# Patient Record
Sex: FEMALE | Race: WHITE
[De-identification: names, ages, dates, MRNs, and addresses within clinical notes are randomized per-mention and may not be internally consistent; named-entity substitution may affect disease eponyms.]

---

## 2018-07-19 ENCOUNTER — HOSPITAL ENCOUNTER (OUTPATIENT)
Dept: HOSPITAL 47 - SLEEP | Age: 83
End: 2018-07-19
Attending: INTERNAL MEDICINE
Payer: MEDICARE

## 2018-07-19 DIAGNOSIS — Z53.9: ICD-10-CM

## 2018-07-19 DIAGNOSIS — G47.33: Primary | ICD-10-CM

## 2018-07-19 NOTE — PN
PROGRESS NOTE



DATE OF SERVICE:

07/19/2018



This patient is an 83-year-old lady who has been followed in the sleep center for

treatment of obstructive sleep apnea-hypopnea syndrome.



The patient is on treatment with CPAP and uses it well, but recently she developed some

problems related to humidity.  The humidifier is working, but for some reason there was

not enough humidity in her mask.



[place] Sleepiness Scale today is 16.



I checked her CPAP unit.  CPAP pressure in the machine is only 4 cm of water.  I

checked the humidifier, and it is working.  There is production of heat on the base of

the humidifier.



MEDICATIONS:

1. Insulin.

2. Simvastatin.

3. Lotensin.



PHYSICAL EXAMINATION:

GENERAL  A pleasant lady without  distress.

VITAL SIGNS: /44, HR 78, RR 14, height 5 feet 0 inches, weight 134.8, BMI 26.1.

Temperature 97.9. Oxygen saturation at room air 97%.

HEENT: PERRLA, EOMI. Evaluation of oropharynx showed tongue protrudes midline; low

position of soft palate.

NECK: Supple. No JVD.  Thyroid is not palpable.

LUNGS: Clear to percussion and to auscultation.  Good air exchange.  No wheezing or

rhonchi.

HEART: S1, S2 regular.  No murmurs, gallops or rubs.

ABDOMEN: Soft and nontender.  Bowel sounds are present.  No organomegaly appreciated.

EXTREMITIES : No clubbing or cyanosis.

CNS: Awake, alert, and oriented X3.  Cranial nerves 2 to 7 intact.  There is no

fasciculation or atrophy. noted.  No focal deficits observed.



IMPRESSION:

1. Severe obstructive apnea-hypopnea syndrome. Patient is benefitting from CPAP

    therapy.  She recently had some problems with the humidifier.

2. History of hypertension.

3. Diabetes mellitus.

4. Hyperlipidemia.

5. Carpal tunnel syndrome.



PLAN:

1. I checked the patient's CPAP unit and adjusted CPAP pressure up to 7 cm of water.

2. Patient will continue to use her CPAP equipment every night for the whole night.

3. The filter in the machine will be replaced.  The patient has new filters for

    replacement.

4. Prescription for all necessary CPAP supplies, including mask, tubes and filters.

5. Sleep hygiene with regular time in bed for at least 8 hours.

Sincerely,







Ephraim Liang MD, PhD, FAASM

Diplomat of [employer]

[employer]

Medical Director of [facility]





MMKAMINI / ELLAN: [id number] / Job#: [id number]